# Patient Record
(demographics unavailable — no encounter records)

---

## 2025-06-20 NOTE — DISCUSSION/SUMMARY
[FreeTextEntry1] :  Chronic Hypertension In Pregnancy: Chronic hypertension complicates 2% of pregnancies. It is associated with significant adverse maternal and fetal outcomes including super-imposed pre-eclampsia and its adverse maternal health consequences including but not limited to  birth, fetal growth restriction, fetal demise, and placental abruption. The incidence of adverse outcomes seen in pregnancies complicated by chronic hypertension is related to the degree and duration of hypertension and to the association of other organ system involvement or damage. Superimposed preeclampsia is the primary  of adverse outcomes.  Compared to normotensive pregnancies, those complicated by chronic hypertension have substantially increased rates of  birth, fetal growth restriction and  mortality. Besides superimposed preeclampsia or eclampsia, pregnancies complicated by chronic hypertension are associated with maternal adverse outcomes such as worsening or malignant hypertension, cerebrovascular accident, cardiac decompensation, renal insufficiency.Ideally, a woman with chronic hypertension should be evaluated before pregnancy to ascertain potentially reversible causes and end-organ involvement (e.g., heart or kidney). Baseline laboratory tests may include serum creatinine, blood urea nitrogen, and spot urine-creatinine ratio or 24-hour urine evaluation for total protein and creatinine clearance. Women who have had hypertension for several years are more likely to have cardiomegaly, ischemic heart disease, renal involvement, and retinopathy. Tests that may prove useful in the evaluation of these women include electrocardiography, echocardiography, ophthalmologic examination, and renal ultrasonography. In women with chronic hypertension, it can be difficult to distinguish worsening hypertension that might occur as a result of physiological changes of pregnancy in the third trimester from the development of preeclampsia.  Baseline laboratory values from early in pregnancy can help to delineate these conditions. A multicenter [place] trial of pharmacologic treatment of mild chronic hypertension during pregnancy found a reduced risk of the primary composite outcome (preeclampsia with severe features, medically indicated  birth <35 weeks, placental abruption, or fetal or  death) as well as of any preeclampsia,  birth or severe hypertension in women who were treated in pregnancy. The incidence of birthweight less than the 10th percentile was similar in the groups. Therefore recommend to start or continue antihypertensive therapy in women with chronic hypertension (including mild) who become pregnant. Therapy should be adjusted to keep BP <140 mmHg systolic and <90 mmHg diastolic.  It is uncertain whether patients with new stage 1 chronic hypertension (130-139/80-89) should receive pharmacologic therapy. Although there are numerous antihypertensive agents that have been used for the treatment of chronic hypertension during pregnancy, nifedipine and labetalol are commonly used and were used in the trial described above. It is recommended to initiate daily low-dose aspirin (81 mg) between 12 and 28 weeks (optimally before 16 weeks) and to continue until delivery to prevent preeclampsia. There is no consensus as to the most appropriate fetal surveillance test(s) or the interval and timing of testing in pregnant women with chronic hypertension. Thus, such testing should be individualized and based on clinical judgment and severity of disease. Timing of delivery can be individualized based on the severity of disease however is typically recommended between 37-39wga.   - The patient's office blood pressures would qualify for a diagnosis of CHTN however her home monitoring is wnl. We continue data collection. The frequency of antepartum testing and the timing of delivery can be individualized for this patient as her pregnancy evolves. Plan for bASA to mitigate the risk of early onset pre-eclampsia. The patient was counseled on the physiologic changes of pregnancy with regard to HTN and will continue home BP monitoring. The signs and symptoms of preeclampsia were reviewed.

## 2025-06-20 NOTE — HISTORY OF PRESENT ILLNESS
[Home] : at home, [unfilled] , at the time of the visit. [Medical Office: ([facility])___] : at the medical office located in  [Telehealth (audio & video)] : This visit was provided via telehealth using real-time 2-way audio visual technology. [Verbal consent obtained from patient] : the patient, [unfilled] [FreeTextEntry1] :  Chief complaint: HTN  Ms. Trimble is a 34 yo  at 18wga presenting for [facility] consultation.  Her self reported ethnicity is white and she works at [employer] in the [employer] division.   PMH: new diagnosis of CHTN in pregnancy PSH: denies Medications: PNV NKDA There is no family history of birth defects, intellectual disability, genetic conditions, or stillbirths.   The patient reports no prior history of HTN prior to pregnancy. She notes a history of "white coat HTN" as a child due to a fear of doctors. There is no family history of HTN. The patient is performing home BP monitoring and reports that her values range from 120-130 systolic and  diastolic. She is asymptomatic. Her baseline 24 hr urine protein was wnl. She has an appointment scheduled with cardiology.